# Patient Record
Sex: FEMALE | Race: WHITE | NOT HISPANIC OR LATINO | Employment: FULL TIME | URBAN - NONMETROPOLITAN AREA
[De-identification: names, ages, dates, MRNs, and addresses within clinical notes are randomized per-mention and may not be internally consistent; named-entity substitution may affect disease eponyms.]

---

## 2019-12-11 ENCOUNTER — LAB (OUTPATIENT)
Dept: LAB | Facility: HOSPITAL | Age: 24
End: 2019-12-11

## 2019-12-11 ENCOUNTER — OFFICE VISIT (OUTPATIENT)
Dept: CARDIOLOGY | Facility: CLINIC | Age: 24
End: 2019-12-11

## 2019-12-11 VITALS
HEART RATE: 84 BPM | DIASTOLIC BLOOD PRESSURE: 79 MMHG | HEIGHT: 64 IN | OXYGEN SATURATION: 98 % | SYSTOLIC BLOOD PRESSURE: 124 MMHG | BODY MASS INDEX: 29.02 KG/M2 | WEIGHT: 170 LBS

## 2019-12-11 DIAGNOSIS — R42 DIZZINESS: ICD-10-CM

## 2019-12-11 DIAGNOSIS — R42 DIZZINESS: Primary | ICD-10-CM

## 2019-12-11 DIAGNOSIS — R53.1 WEAKNESS: ICD-10-CM

## 2019-12-11 LAB
ALBUMIN SERPL-MCNC: 4.67 G/DL (ref 3.5–5.2)
ALBUMIN/GLOB SERPL: 1.5 G/DL
ALP SERPL-CCNC: 86 U/L (ref 39–117)
ALT SERPL W P-5'-P-CCNC: 14 U/L (ref 1–33)
ANION GAP SERPL CALCULATED.3IONS-SCNC: 12.9 MMOL/L (ref 5–15)
AST SERPL-CCNC: 20 U/L (ref 1–32)
BASOPHILS # BLD AUTO: 0.03 10*3/MM3 (ref 0–0.2)
BASOPHILS NFR BLD AUTO: 0.4 % (ref 0–1.5)
BILIRUB SERPL-MCNC: 0.3 MG/DL (ref 0.2–1.2)
BUN BLD-MCNC: 7 MG/DL (ref 6–20)
BUN/CREAT SERPL: 9 (ref 7–25)
CALCIUM SPEC-SCNC: 9.4 MG/DL (ref 8.6–10.5)
CHLORIDE SERPL-SCNC: 104 MMOL/L (ref 98–107)
CO2 SERPL-SCNC: 23.1 MMOL/L (ref 22–29)
CREAT BLD-MCNC: 0.78 MG/DL (ref 0.57–1)
DEPRECATED RDW RBC AUTO: 38.5 FL (ref 37–54)
EOSINOPHIL # BLD AUTO: 0.1 10*3/MM3 (ref 0–0.4)
EOSINOPHIL NFR BLD AUTO: 1.3 % (ref 0.3–6.2)
ERYTHROCYTE [DISTWIDTH] IN BLOOD BY AUTOMATED COUNT: 12.2 % (ref 12.3–15.4)
GFR SERPL CREATININE-BSD FRML MDRD: 92 ML/MIN/1.73
GLOBULIN UR ELPH-MCNC: 3.1 GM/DL
GLUCOSE BLD-MCNC: 98 MG/DL (ref 65–99)
HCT VFR BLD AUTO: 40.7 % (ref 34–46.6)
HGB BLD-MCNC: 13.2 G/DL (ref 12–15.9)
IMM GRANULOCYTES # BLD AUTO: 0.02 10*3/MM3 (ref 0–0.05)
IMM GRANULOCYTES NFR BLD AUTO: 0.3 % (ref 0–0.5)
LYMPHOCYTES # BLD AUTO: 2.07 10*3/MM3 (ref 0.7–3.1)
LYMPHOCYTES NFR BLD AUTO: 26.5 % (ref 19.6–45.3)
MCH RBC QN AUTO: 27.9 PG (ref 26.6–33)
MCHC RBC AUTO-ENTMCNC: 32.4 G/DL (ref 31.5–35.7)
MCV RBC AUTO: 86 FL (ref 79–97)
MONOCYTES # BLD AUTO: 0.45 10*3/MM3 (ref 0.1–0.9)
MONOCYTES NFR BLD AUTO: 5.8 % (ref 5–12)
NEUTROPHILS # BLD AUTO: 5.15 10*3/MM3 (ref 1.7–7)
NEUTROPHILS NFR BLD AUTO: 65.7 % (ref 42.7–76)
NRBC BLD AUTO-RTO: 0 /100 WBC (ref 0–0.2)
PLATELET # BLD AUTO: 278 10*3/MM3 (ref 140–450)
PMV BLD AUTO: 10.9 FL (ref 6–12)
POTASSIUM BLD-SCNC: 4.4 MMOL/L (ref 3.5–5.2)
PROT SERPL-MCNC: 7.8 G/DL (ref 6–8.5)
RBC # BLD AUTO: 4.73 10*6/MM3 (ref 3.77–5.28)
SODIUM BLD-SCNC: 140 MMOL/L (ref 136–145)
TSH SERPL DL<=0.05 MIU/L-ACNC: 3.37 UIU/ML (ref 0.27–4.2)
WBC NRBC COR # BLD: 7.82 10*3/MM3 (ref 3.4–10.8)

## 2019-12-11 PROCEDURE — 85025 COMPLETE CBC W/AUTO DIFF WBC: CPT | Performed by: PHYSICIAN ASSISTANT

## 2019-12-11 PROCEDURE — 84443 ASSAY THYROID STIM HORMONE: CPT | Performed by: PHYSICIAN ASSISTANT

## 2019-12-11 PROCEDURE — 36415 COLL VENOUS BLD VENIPUNCTURE: CPT

## 2019-12-11 PROCEDURE — 99202 OFFICE O/P NEW SF 15 MIN: CPT | Performed by: PHYSICIAN ASSISTANT

## 2019-12-11 PROCEDURE — 80053 COMPREHEN METABOLIC PANEL: CPT | Performed by: PHYSICIAN ASSISTANT

## 2019-12-11 PROCEDURE — 93000 ELECTROCARDIOGRAM COMPLETE: CPT | Performed by: PHYSICIAN ASSISTANT

## 2019-12-11 RX ORDER — SERTRALINE HYDROCHLORIDE 25 MG/1
25 TABLET, FILM COATED ORAL DAILY
Qty: 90 TABLET | Refills: 3 | Status: SHIPPED | OUTPATIENT
Start: 2019-12-11 | End: 2020-04-27 | Stop reason: SDUPTHER

## 2019-12-11 NOTE — PATIENT INSTRUCTIONS

## 2019-12-11 NOTE — PROGRESS NOTES
Subjective   Tangela Hyman is a 24 y.o. female     Chief Complaint   Patient presents with   • [facility] Care     patient appears in office today to [facility] cardiac care   • Dizziness       HPI  The patient presents today for initial evaluation.  She wanted to be evaluated today because of dizziness and weakness noted earlier today.  The patient denies cardiovascular history.  She reports that she possibly could have had an episode of hypoglycemia.  She did not eat very well for breakfast this morning.  She is going along unusual routine.  She developed initial and acute onset weakness and dizziness.  She had even presyncope noted.  There is certainly no syncope.  She has no associated chest discomfort.  There was no associated palpitations or dysrhythmic symptoms otherwise.  She feels that a lot of her issues could have been anxiety related.  She typically has a degree of chest tightness and dizziness when very stressed or anxious otherwise.  She also expresses interest in treatment of anxiety.  She has no further complaints otherwise at this time.      Current Outpatient Medications   Medication Sig Dispense Refill   • sertraline (ZOLOFT) 25 MG tablet Take 1 tablet by mouth Daily. 90 tablet 3     No current facility-administered medications for this visit.        Patient has no known allergies.    History reviewed. No pertinent past medical history.    Social History     Socioeconomic History   • Marital status: Single     Spouse name: Not on file   • Number of children: Not on file   • Years of education: Not on file   • Highest education level: Not on file   Tobacco Use   • Smoking status: Never Smoker   • Smokeless tobacco: Never Used   Substance and Sexual Activity   • Alcohol use: Never     Frequency: Never   • Drug use: Never   • Sexual activity: Defer       History reviewed. No pertinent family history.    Review of Systems   Constitutional: Negative.  Negative for fatigue.   HENT: Negative.  Negative for  "congestion, rhinorrhea and sneezing.    Eyes: Negative.  Negative for visual disturbance.   Respiratory: Negative.  Negative for cough, chest tightness, shortness of breath and wheezing.    Cardiovascular: Negative.  Negative for chest pain, palpitations and leg swelling.   Gastrointestinal: Negative.  Negative for abdominal pain and nausea.   Endocrine: Negative.  Negative for cold intolerance and heat intolerance.   Genitourinary: Negative.  Negative for difficulty urinating, frequency and urgency.   Musculoskeletal: Negative.  Negative for arthralgias, back pain, neck pain and neck stiffness.   Skin: Negative.  Negative for rash and wound.   Allergic/Immunologic: Negative.  Negative for environmental allergies and food allergies.   Neurological: Positive for dizziness (dizziness with near syncopal episode) and light-headedness (light headedness with near syncope). Negative for syncope.   Hematological: Negative.  Does not bruise/bleed easily.   Psychiatric/Behavioral: Negative for agitation, confusion and sleep disturbance. The patient is nervous/anxious (easily anxious).        Objective     Vitals:    12/11/19 1541   BP: 124/79   BP Location: Left arm   Patient Position: Sitting   Pulse: 84   SpO2: 98%   Weight: 77.1 kg (170 lb)   Height: 162.6 cm (64\")        /79 (BP Location: Left arm, Patient Position: Sitting)   Pulse 84   Ht 162.6 cm (64\")   Wt 77.1 kg (170 lb)   SpO2 98%   BMI 29.18 kg/m²      Lab Results (most recent)     None          Physical Exam   Constitutional: She is oriented to person, place, and time. She appears well-developed and well-nourished. No distress.   HENT:   Head: Normocephalic and atraumatic.   Eyes: Pupils are equal, round, and reactive to light. Conjunctivae and EOM are normal.   Neck: Normal range of motion. Neck supple. No JVD present. No tracheal deviation present.   Cardiovascular: Normal rate, regular rhythm, normal heart sounds and intact distal pulses. "   Pulmonary/Chest: Effort normal and breath sounds normal.   Abdominal: Soft. Bowel sounds are normal. She exhibits no distension and no mass. There is no tenderness. There is no rebound and no guarding.   Musculoskeletal: Normal range of motion. She exhibits no edema, tenderness or deformity.   Neurological: She is alert and oriented to person, place, and time.   Skin: Skin is warm and dry. No rash noted. No erythema. No pallor.   Psychiatric: She has a normal mood and affect. Her behavior is normal. Judgment and thought content normal.   Nursing note and vitals reviewed.      Procedure     ECG 12 Lead  Date/Time: 12/11/2019 3:43 PM  Performed by: Low Estevez PA  Authorized by: Low Estevez PA   Previous ECG: no previous ECG available  Comments: Sinus rhythm at 82, right axis deviation, minor nonspecific ST and T wave changes, no acute changes noted.                 Assessment/Plan      Diagnosis Plan   1. Dizziness  CBC & Differential    Comprehensive Metabolic Panel    TSH    ECG 12 Lead   2. Weakness       1.  The patient describes dizziness weakness occurring this morning.  This may have been a degree of hypoglycemia.  This also has a component of anxiety however.  I would like to schedule for routine laboratories as above.    2.  Her EKG and exam certainly is benign and suggest no significant cardiac issues or anomalies.  For continued symptoms, I would consider an echo and even potential telemetric monitoring.  For now, the majority of her symptoms appear to be directly related to anxiety however.    3.  In that setting, the patient does request consideration for anxiolytic type medications.  We will start her on very low-dose sertraline at 25 mg 1 p.o. daily.  For any complications with that, she will discontinue that therapy completely.  We will titrate up as needed and as able by clinical course.    4.  We will see her back and review results of the above studies.  As soon as we have the  laboratories available, we will call and recommend her further.  If she has continued symptoms, I would proceed on with testing as above.              Patient's Body mass index is 29.18 kg/m². BMI is above normal parameters. Recommendations include: educational material and referral to primary care.           Electronically signed by:

## 2020-04-27 RX ORDER — SERTRALINE HYDROCHLORIDE 25 MG/1
25 TABLET, FILM COATED ORAL DAILY
Qty: 90 TABLET | Refills: 3 | Status: SHIPPED | OUTPATIENT
Start: 2020-04-27

## 2020-08-05 ENCOUNTER — LAB (OUTPATIENT)
Dept: CARDIOLOGY | Facility: CLINIC | Age: 25
End: 2020-08-05

## 2020-08-05 DIAGNOSIS — R30.9 PAINFUL URINATION: ICD-10-CM

## 2020-08-05 DIAGNOSIS — R30.9 PAINFUL URINATION: Primary | ICD-10-CM

## 2020-08-05 LAB
BILIRUB UR QL STRIP: NEGATIVE
CLARITY UR: CLEAR
COLOR UR: YELLOW
GLUCOSE UR STRIP-MCNC: NEGATIVE MG/DL
HGB UR QL STRIP.AUTO: NEGATIVE
KETONES UR QL STRIP: NEGATIVE
LEUKOCYTE ESTERASE UR QL STRIP.AUTO: NEGATIVE
NITRITE UR QL STRIP: NEGATIVE
PH UR STRIP.AUTO: 7 [PH] (ref 5–8)
PROT UR QL STRIP: NEGATIVE
SP GR UR STRIP: >1.03 (ref 1–1.03)
UROBILINOGEN UR QL STRIP: ABNORMAL

## 2020-08-05 PROCEDURE — 81003 URINALYSIS AUTO W/O SCOPE: CPT | Performed by: NURSE PRACTITIONER

## 2022-02-03 DIAGNOSIS — R50.9 FEVER, UNSPECIFIED FEVER CAUSE: ICD-10-CM

## 2022-02-03 DIAGNOSIS — J02.9 PHARYNGITIS, UNSPECIFIED ETIOLOGY: ICD-10-CM

## 2022-02-03 DIAGNOSIS — M79.10 MYALGIA: Primary | ICD-10-CM

## 2022-02-21 DIAGNOSIS — R39.15 URINARY URGENCY: Primary | ICD-10-CM

## 2023-02-23 ENCOUNTER — LAB (OUTPATIENT)
Dept: CARDIOLOGY | Facility: CLINIC | Age: 28
End: 2023-02-23
Payer: COMMERCIAL

## 2023-02-23 DIAGNOSIS — R30.0 DYSURIA: ICD-10-CM

## 2023-02-23 DIAGNOSIS — R30.0 DYSURIA: Primary | ICD-10-CM

## 2023-02-23 LAB
BACTERIA UR QL AUTO: ABNORMAL /HPF
BILIRUB UR QL STRIP: NEGATIVE
CLARITY UR: CLEAR
COLOR UR: YELLOW
GLUCOSE UR STRIP-MCNC: NEGATIVE MG/DL
HGB UR QL STRIP.AUTO: NEGATIVE
HYALINE CASTS UR QL AUTO: ABNORMAL /LPF
KETONES UR QL STRIP: NEGATIVE
LEUKOCYTE ESTERASE UR QL STRIP.AUTO: NEGATIVE
NITRITE UR QL STRIP: NEGATIVE
PH UR STRIP.AUTO: 6 [PH] (ref 5–8)
PROT UR QL STRIP: NEGATIVE
RBC # UR STRIP: ABNORMAL /HPF
REF LAB TEST METHOD: ABNORMAL
SP GR UR STRIP: 1.02 (ref 1–1.03)
SQUAMOUS #/AREA URNS HPF: ABNORMAL /HPF
UROBILINOGEN UR QL STRIP: NORMAL
WBC # UR STRIP: ABNORMAL /HPF

## 2023-02-23 PROCEDURE — 81001 URINALYSIS AUTO W/SCOPE: CPT | Performed by: PHYSICIAN ASSISTANT

## 2023-10-12 ENCOUNTER — LAB (OUTPATIENT)
Dept: LAB | Facility: HOSPITAL | Age: 28
End: 2023-10-12
Payer: COMMERCIAL

## 2023-10-12 DIAGNOSIS — R30.0 DYSURIA: Primary | ICD-10-CM

## 2023-10-12 DIAGNOSIS — R30.0 DYSURIA: ICD-10-CM

## 2023-10-12 LAB
BILIRUB UR QL STRIP: NEGATIVE
CLARITY UR: CLEAR
COLOR UR: YELLOW
GLUCOSE UR STRIP-MCNC: NEGATIVE MG/DL
HGB UR QL STRIP.AUTO: NEGATIVE
KETONES UR QL STRIP: NEGATIVE
LEUKOCYTE ESTERASE UR QL STRIP.AUTO: NEGATIVE
NITRITE UR QL STRIP: NEGATIVE
PH UR STRIP.AUTO: 5.5 [PH] (ref 5–8)
PROT UR QL STRIP: NEGATIVE
SP GR UR STRIP: 1.01 (ref 1–1.03)
UROBILINOGEN UR QL STRIP: NORMAL

## 2023-10-12 PROCEDURE — 81003 URINALYSIS AUTO W/O SCOPE: CPT | Performed by: PHYSICIAN ASSISTANT

## 2024-02-05 ENCOUNTER — OFFICE VISIT (OUTPATIENT)
Dept: CARDIOLOGY | Facility: CLINIC | Age: 29
End: 2024-02-05
Payer: COMMERCIAL

## 2024-02-05 ENCOUNTER — LAB (OUTPATIENT)
Dept: CARDIOLOGY | Facility: CLINIC | Age: 29
End: 2024-02-05
Payer: COMMERCIAL

## 2024-02-05 VITALS
DIASTOLIC BLOOD PRESSURE: 80 MMHG | HEART RATE: 75 BPM | HEIGHT: 64 IN | BODY MASS INDEX: 34.89 KG/M2 | SYSTOLIC BLOOD PRESSURE: 119 MMHG | WEIGHT: 204.4 LBS | OXYGEN SATURATION: 97 %

## 2024-02-05 DIAGNOSIS — R55 PRE-SYNCOPE: ICD-10-CM

## 2024-02-05 DIAGNOSIS — R00.2 PALPITATIONS: ICD-10-CM

## 2024-02-05 DIAGNOSIS — R06.02 SHORTNESS OF BREATH: ICD-10-CM

## 2024-02-05 DIAGNOSIS — R06.02 SHORTNESS OF BREATH: Primary | ICD-10-CM

## 2024-02-05 LAB
ALBUMIN SERPL-MCNC: 4.6 G/DL (ref 3.5–5.2)
ALBUMIN/GLOB SERPL: 1.4 G/DL
ALP SERPL-CCNC: 91 U/L (ref 39–117)
ALT SERPL W P-5'-P-CCNC: 17 U/L (ref 1–33)
ANION GAP SERPL CALCULATED.3IONS-SCNC: 12.6 MMOL/L (ref 5–15)
AST SERPL-CCNC: 20 U/L (ref 1–32)
BASOPHILS # BLD AUTO: 0.07 10*3/MM3 (ref 0–0.2)
BASOPHILS NFR BLD AUTO: 0.7 % (ref 0–1.5)
BILIRUB SERPL-MCNC: 0.2 MG/DL (ref 0–1.2)
BUN SERPL-MCNC: 10 MG/DL (ref 6–20)
BUN/CREAT SERPL: 11.8 (ref 7–25)
CALCIUM SPEC-SCNC: 9.8 MG/DL (ref 8.6–10.5)
CHLORIDE SERPL-SCNC: 101 MMOL/L (ref 98–107)
CO2 SERPL-SCNC: 22.4 MMOL/L (ref 22–29)
CREAT SERPL-MCNC: 0.85 MG/DL (ref 0.57–1)
DEPRECATED RDW RBC AUTO: 39.1 FL (ref 37–54)
EGFRCR SERPLBLD CKD-EPI 2021: 97 ML/MIN/1.73
EOSINOPHIL # BLD AUTO: 0.35 10*3/MM3 (ref 0–0.4)
EOSINOPHIL NFR BLD AUTO: 3.4 % (ref 0.3–6.2)
ERYTHROCYTE [DISTWIDTH] IN BLOOD BY AUTOMATED COUNT: 12.8 % (ref 12.3–15.4)
GLOBULIN UR ELPH-MCNC: 3.3 GM/DL
GLUCOSE SERPL-MCNC: 120 MG/DL (ref 65–99)
HCT VFR BLD AUTO: 42.5 % (ref 34–46.6)
HGB BLD-MCNC: 14.2 G/DL (ref 12–15.9)
IMM GRANULOCYTES # BLD AUTO: 0.04 10*3/MM3 (ref 0–0.05)
IMM GRANULOCYTES NFR BLD AUTO: 0.4 % (ref 0–0.5)
LYMPHOCYTES # BLD AUTO: 2.6 10*3/MM3 (ref 0.7–3.1)
LYMPHOCYTES NFR BLD AUTO: 25.1 % (ref 19.6–45.3)
MAGNESIUM SERPL-MCNC: 2.1 MG/DL (ref 1.6–2.6)
MCH RBC QN AUTO: 28.2 PG (ref 26.6–33)
MCHC RBC AUTO-ENTMCNC: 33.4 G/DL (ref 31.5–35.7)
MCV RBC AUTO: 84.5 FL (ref 79–97)
MONOCYTES # BLD AUTO: 0.53 10*3/MM3 (ref 0.1–0.9)
MONOCYTES NFR BLD AUTO: 5.1 % (ref 5–12)
NEUTROPHILS NFR BLD AUTO: 6.76 10*3/MM3 (ref 1.7–7)
NEUTROPHILS NFR BLD AUTO: 65.3 % (ref 42.7–76)
NRBC BLD AUTO-RTO: 0 /100 WBC (ref 0–0.2)
PLATELET # BLD AUTO: 342 10*3/MM3 (ref 140–450)
PMV BLD AUTO: 11.2 FL (ref 6–12)
POTASSIUM SERPL-SCNC: 4.1 MMOL/L (ref 3.5–5.2)
PROT SERPL-MCNC: 7.9 G/DL (ref 6–8.5)
RBC # BLD AUTO: 5.03 10*6/MM3 (ref 3.77–5.28)
SODIUM SERPL-SCNC: 136 MMOL/L (ref 136–145)
TSH SERPL DL<=0.05 MIU/L-ACNC: 2.91 UIU/ML (ref 0.27–4.2)
WBC NRBC COR # BLD AUTO: 10.35 10*3/MM3 (ref 3.4–10.8)

## 2024-02-05 PROCEDURE — 93000 ELECTROCARDIOGRAM COMPLETE: CPT | Performed by: PHYSICIAN ASSISTANT

## 2024-02-05 PROCEDURE — 83735 ASSAY OF MAGNESIUM: CPT | Performed by: PHYSICIAN ASSISTANT

## 2024-02-05 PROCEDURE — 99203 OFFICE O/P NEW LOW 30 MIN: CPT | Performed by: PHYSICIAN ASSISTANT

## 2024-02-05 PROCEDURE — 80050 GENERAL HEALTH PANEL: CPT | Performed by: PHYSICIAN ASSISTANT

## 2024-02-05 NOTE — PROGRESS NOTES
Nicky Farias is a 28 y.o. female     Chief Complaint   Patient presents with    Establish Care     Cardiac eval     Dizziness    Palpitations       HPI    The patient presents in the clinic today to reestablish cardiovascular care.  This very pleasant patient has been followed historically through the clinic because of recurrent episodes of dizziness, weakness, and symptoms otherwise.  Her main concern in the past was potential hypoglycemia contributing to symptoms.  She was seen and evaluated accordingly.  She had done well up until recently.  Today, while at work, the patient tells me that she had an episode of presyncope.  She feels that some symptoms may have been related more to stress and anxiety.  Nonetheless, she had an episode of immediate onset of weakness, dizziness, and eventually presyncope.  She had associated tachycardia.  She had nausea and emesis.  There was no loss of bowel or bladder control.  No seizure activity was noted.  She reported no chest pain and has experienced no chest pain as of recent otherwise.  During episode as above, symptoms persisted for approximately 2 to 3 minutes and eventually resolved.  After that time, she had residual nausea and weakness.  She at that time requested cardiac evaluation and presents today in that setting.  The patient reports no significant cardiovascular symptoms over the last several weeks to months.  She has had no further issues otherwise.  Her main issue appears to be mostly with stress and anxiety, which I feel likely is at least some degree contributing to clinical scenario.  She is concerned about symptoms today and would like evaluation at this time.  Of note, EKG today indicates no acute abnormalities.  Orthostatic blood pressure check indicates no significant abnormalities or changes otherwise.    No current outpatient medications on file.     No current facility-administered medications for this visit.  "      Sulfamethoxazole-trimethoprim    Past Medical History:   Diagnosis Date    Anxiety        Social History     Socioeconomic History    Marital status:    Tobacco Use    Smoking status: Never    Smokeless tobacco: Never   Substance and Sexual Activity    Alcohol use: Never    Drug use: Never    Sexual activity: Defer       Family History   Problem Relation Age of Onset    Hypertension Mother        Review of Systems   Constitutional: Negative.  Negative for chills, diaphoresis, fatigue and fever.   HENT: Negative.     Eyes: Negative.  Negative for visual disturbance.   Respiratory: Negative.  Negative for apnea, cough, chest tightness, shortness of breath and wheezing.    Cardiovascular:  Positive for palpitations. Negative for chest pain and leg swelling.   Gastrointestinal: Negative.  Negative for abdominal pain and blood in stool.   Endocrine: Negative.    Genitourinary: Negative.  Negative for hematuria.   Musculoskeletal: Negative.  Negative for arthralgias, back pain, myalgias, neck pain and neck stiffness.   Skin: Negative.  Negative for rash and wound.   Allergic/Immunologic: Negative.  Negative for environmental allergies and food allergies.   Neurological:  Positive for dizziness, syncope (near syncope) and light-headedness. Negative for weakness, numbness and headaches.   Hematological: Negative.  Does not bruise/bleed easily.   Psychiatric/Behavioral: Negative.  Negative for sleep disturbance.        Objective     Vitals:    02/05/24 1612   BP: 119/80   Pulse: 75   SpO2: 97%   Weight: 92.7 kg (204 lb 6.4 oz)   Height: 162.6 cm (64\")        /80   Pulse 75   Ht 162.6 cm (64\")   Wt 92.7 kg (204 lb 6.4 oz)   SpO2 97%   BMI 35.09 kg/m²      Lab Results (most recent)       None            Physical Exam  Vitals and nursing note reviewed.   Constitutional:       General: She is not in acute distress.     Appearance: She is well-developed.   HENT:      Head: Normocephalic and atraumatic. "   Eyes:      Conjunctiva/sclera: Conjunctivae normal.      Pupils: Pupils are equal, round, and reactive to light.   Neck:      Vascular: No JVD.      Trachea: No tracheal deviation.   Cardiovascular:      Rate and Rhythm: Normal rate and regular rhythm.      Heart sounds: Normal heart sounds.   Pulmonary:      Effort: Pulmonary effort is normal.      Breath sounds: Normal breath sounds.   Abdominal:      General: Bowel sounds are normal. There is no distension.      Palpations: Abdomen is soft. There is no mass.      Tenderness: There is no abdominal tenderness. There is no guarding or rebound.   Musculoskeletal:         General: No tenderness or deformity. Normal range of motion.      Cervical back: Normal range of motion and neck supple.   Skin:     General: Skin is warm and dry.      Coloration: Skin is not pale.      Findings: No erythema or rash.   Neurological:      Mental Status: She is alert and oriented to person, place, and time.   Psychiatric:         Behavior: Behavior normal.         Thought Content: Thought content normal.         Judgment: Judgment normal.         Procedure     ECG 12 Lead    Date/Time: 2/5/2024 4:41 PM  Performed by: Low Estevez PA    Authorized by: Low Estevez PA  Comparison: not compared with previous ECG                Assessment & Plan      Diagnosis Plan   1. Shortness of breath  Adult Transthoracic Echo Complete W/ Cont if Necessary Per Protocol    CBC & Differential    Comprehensive Metabolic Panel    Magnesium    TSH    Cardiac Event Monitor      2. Pre-syncope  Adult Transthoracic Echo Complete W/ Cont if Necessary Per Protocol    CBC & Differential    Comprehensive Metabolic Panel    Magnesium    TSH    Cardiac Event Monitor      3. Palpitations  Adult Transthoracic Echo Complete W/ Cont if Necessary Per Protocol    CBC & Differential    Comprehensive Metabolic Panel    Magnesium    TSH    Cardiac Event Monitor        1.  The patient presents for evaluation  because of dizziness, presyncope, and symptoms otherwise as above.  I feel that a lot of the patient's symptoms are likely related to stress/anxiety.  She is very concerned about the potential of hypoglycemia.  Nonetheless, she would like further cardiac evaluation and presents today in that setting.    2.  We will schedule for an event monitor to evaluate for rate or rhythm disturbance issues potentially contributing to symptoms.    3.  I would also schedule for an echo to evaluate LV size and function, valvular morphologies, and cardiac structure otherwise.    4.  We will schedule for routine laboratories, all as outlined above.    5.  If the above is unremarkable, I would not feel further evaluation would be indicated and less symptoms should persist.  We can see the patient back to discuss study results and recommended further.  She will call for any recurrent issues.               Electronically signed by:

## 2024-02-06 RX ORDER — ESCITALOPRAM OXALATE 10 MG/1
10 TABLET ORAL DAILY
Qty: 30 TABLET | Refills: 2 | Status: SHIPPED | OUTPATIENT
Start: 2024-02-06

## 2024-04-02 ENCOUNTER — TELEPHONE (OUTPATIENT)
Dept: CARDIOLOGY | Facility: CLINIC | Age: 29
End: 2024-04-02
Payer: COMMERCIAL

## 2024-04-02 NOTE — TELEPHONE ENCOUNTER
----- Message from Nancy Dowell sent at 4/1/2024  6:21 PM EDT -----    ----- Message -----  From: Stefania Ashby MA  Sent: 4/1/2024   1:33 PM EDT  To: HOLLEY Bynum      ----- Message -----  From: Low Estevez PA  Sent: 4/1/2024   1:21 PM EDT  To: Stefania Ashby MA    Routine follow-up.  ----- Message -----  From: Calixto Hardin MD  Sent: 3/27/2024   6:01 PM EDT  To: BRAYAN Hopper    Cardiac Event Monitor  Order: [id number]  Status: Final result       Visible to patient: Yes (seen)       Dx: Palpitations; Shortness of breath; Pr...    0 Result Notes  Details    Reading Physician Reading Date Result Priority   Calixto Hardin MD  [phone] 3/27/2024 Routine     Result Text  1.  Sinus rhythm is the baseline and predominant rhythm throughout the study.  2.  Maximum heart rate was at 141 bpm, with a minimum heart rate at 51 bpm.  3.  No other arrhythmia, ectopy, or block of significance was detected.  4.  2 manually detected events were noted, correlating to symptoms.  Both appeared during sinus rhythm

## 2024-04-08 RX ORDER — ESCITALOPRAM OXALATE 10 MG/1
10 TABLET ORAL DAILY
Qty: 30 TABLET | Refills: 1 | Status: SHIPPED | OUTPATIENT
Start: 2024-04-08

## 2024-04-17 ENCOUNTER — LAB (OUTPATIENT)
Dept: CARDIOLOGY | Facility: CLINIC | Age: 29
End: 2024-04-17
Payer: COMMERCIAL

## 2024-04-17 ENCOUNTER — OFFICE VISIT (OUTPATIENT)
Dept: CARDIOLOGY | Facility: CLINIC | Age: 29
End: 2024-04-17
Payer: COMMERCIAL

## 2024-04-17 VITALS
SYSTOLIC BLOOD PRESSURE: 106 MMHG | OXYGEN SATURATION: 98 % | WEIGHT: 202.2 LBS | DIASTOLIC BLOOD PRESSURE: 71 MMHG | BODY MASS INDEX: 34.52 KG/M2 | HEIGHT: 64 IN | HEART RATE: 67 BPM

## 2024-04-17 DIAGNOSIS — R30.0 DYSURIA: ICD-10-CM

## 2024-04-17 DIAGNOSIS — R06.02 SHORTNESS OF BREATH: ICD-10-CM

## 2024-04-17 DIAGNOSIS — R00.2 PALPITATIONS: Primary | ICD-10-CM

## 2024-04-17 LAB
BILIRUB UR QL STRIP: NEGATIVE
CLARITY UR: ABNORMAL
COLOR UR: YELLOW
GLUCOSE UR STRIP-MCNC: NEGATIVE MG/DL
HGB UR QL STRIP.AUTO: NEGATIVE
KETONES UR QL STRIP: NEGATIVE
LEUKOCYTE ESTERASE UR QL STRIP.AUTO: NEGATIVE
NITRITE UR QL STRIP: NEGATIVE
PH UR STRIP.AUTO: 8.5 [PH] (ref 5–8)
PROT UR QL STRIP: NEGATIVE
SP GR UR STRIP: 1.02 (ref 1–1.03)
UROBILINOGEN UR QL STRIP: ABNORMAL

## 2024-04-17 PROCEDURE — 81003 URINALYSIS AUTO W/O SCOPE: CPT | Performed by: PHYSICIAN ASSISTANT

## 2024-04-17 PROCEDURE — 99213 OFFICE O/P EST LOW 20 MIN: CPT | Performed by: PHYSICIAN ASSISTANT

## 2024-04-17 RX ORDER — ACYCLOVIR 200 MG/1
200 CAPSULE ORAL 3 TIMES DAILY
Qty: 90 CAPSULE | Refills: 0 | Status: SHIPPED | OUTPATIENT
Start: 2024-04-17

## 2024-04-17 RX ORDER — ESCITALOPRAM OXALATE 20 MG/1
20 TABLET ORAL DAILY
Qty: 30 TABLET | Refills: 2 | Status: SHIPPED | OUTPATIENT
Start: 2024-04-17

## 2024-04-17 RX ORDER — ACYCLOVIR 200 MG/1
400 CAPSULE ORAL
COMMUNITY
End: 2024-04-17 | Stop reason: SDUPTHER

## 2024-04-17 NOTE — PROGRESS NOTES
Problem list     Subjective   Tangela Farias is a 28 y.o. female     Chief Complaint   Patient presents with    Follow-up     Echo results / holter monitor result's        HPI  The patient presents back in the clinic today for evaluation and follow-up.  She was seen at last evaluation because of episodes of palpitations, dizziness, and even presyncope.  She did note some degree of increasing dyspnea as well.  She was scheduled for laboratory evaluation, with those studies being largely benign.  In particular, TSH was within normal limits.  Chemistry profile and hematologic parameters were largely unremarkable for the patient.  Glucose was at 120 but this was a nonfasting specimen.  We did review this in detail with her.  An echo was scheduled, but because of out-of-pocket cost was not performed.  Event monitor indicated sinus rhythm throughout without significant dysrhythmic activity present.  The patient is now doing well.  She feels that a lot of her symptoms directly related to stress and anxiety.  She was started on a degree of anxiolytic therapy, Lexapro 10 mg daily, and feels that this has been beneficial but would like to discuss increasing the dosing because of ongoing anxiety and associated symptoms.  She denies chest pain.  Dyspnea is at baseline.  She has no failure nor dysrhythmic symptoms otherwise.  She has no further complaints.    No current outpatient medications on file prior to visit.     No current facility-administered medications on file prior to visit.       Sertraline and Sulfamethoxazole-trimethoprim    Past Medical History:   Diagnosis Date    Anxiety        Social History     Socioeconomic History    Marital status:    Tobacco Use    Smoking status: Never    Smokeless tobacco: Never   Substance and Sexual Activity    Alcohol use: Never    Drug use: Never    Sexual activity: Defer       Family History   Problem Relation Age of Onset    Hypertension Mother        Review of Systems  "  Constitutional: Negative.  Negative for chills, diaphoresis, fatigue and fever.   HENT: Negative.     Eyes: Negative.  Negative for visual disturbance.   Respiratory: Negative.  Negative for apnea, cough, chest tightness, shortness of breath and wheezing.    Cardiovascular: Negative.  Negative for chest pain, palpitations and leg swelling.   Gastrointestinal: Negative.  Negative for abdominal pain and blood in stool.   Endocrine: Negative.    Genitourinary: Negative.  Negative for hematuria.   Musculoskeletal: Negative.  Negative for arthralgias, back pain, myalgias, neck pain and neck stiffness.   Skin: Negative.  Negative for rash and wound.   Allergic/Immunologic: Negative.  Negative for environmental allergies and food allergies.   Neurological: Negative.  Negative for dizziness, syncope, weakness, light-headedness, numbness and headaches.   Hematological: Negative.  Does not bruise/bleed easily.   Psychiatric/Behavioral: Negative.  Negative for sleep disturbance.        Objective   Vitals:    04/17/24 1304   BP: 106/71   Pulse: 67   SpO2: 98%   Weight: 91.7 kg (202 lb 3.2 oz)   Height: 162.6 cm (64\")      /71   Pulse 67   Ht 162.6 cm (64\")   Wt 91.7 kg (202 lb 3.2 oz)   SpO2 98%   BMI 34.71 kg/m²    Lab Results (most recent)       None          Physical Exam  Vitals and nursing note reviewed.   Constitutional:       General: She is not in acute distress.     Appearance: She is well-developed.   HENT:      Head: Normocephalic and atraumatic.   Eyes:      Conjunctiva/sclera: Conjunctivae normal.      Pupils: Pupils are equal, round, and reactive to light.   Neck:      Vascular: No JVD.      Trachea: No tracheal deviation.   Cardiovascular:      Rate and Rhythm: Normal rate and regular rhythm.      Heart sounds: Normal heart sounds.   Pulmonary:      Effort: Pulmonary effort is normal.      Breath sounds: Normal breath sounds.   Abdominal:      General: Bowel sounds are normal. There is no distension. "      Palpations: Abdomen is soft. There is no mass.      Tenderness: There is no abdominal tenderness. There is no guarding or rebound.   Musculoskeletal:         General: No tenderness or deformity. Normal range of motion.      Cervical back: Normal range of motion and neck supple.   Skin:     General: Skin is warm and dry.      Coloration: Skin is not pale.      Findings: No erythema or rash.   Neurological:      Mental Status: She is alert and oriented to person, place, and time.   Psychiatric:         Behavior: Behavior normal.         Thought Content: Thought content normal.         Judgment: Judgment normal.           Procedure   Procedures       Assessment & Plan      Diagnosis Plan   1. Palpitations        2. Shortness of breath        3. Dysuria  Urinalysis With Culture If Indicated -      The patient presents in the clinic today for follow-up.  She was scheduled for cardiac evaluation because of symptoms of tachycardia, palpitations, and issues otherwise.  She did admit that the likely the majority of symptoms were related to anxiety.  She has been started on therapy directed at the same.  Symptoms have improved.  She would like to consider slightly higher dose.  We will increase Lexapro to 20 mg daily and monitor course.    2.  Echocardiogram was scheduled but was not performed because of out-of-pocket cost.  Event monitor and laboratories were unremarkable as above.  As symptoms have minimized with therapy directed towards anxiety, nothing further unless clinical course change.    3.  She does have a degree of dysuria.  She would like a UA performed.  We will schedule accordingly.    4.  Nothing further from our standpoint we will see her on a yearly evaluation, sooner for recurrent symptoms.               Electronically signed by:

## 2024-06-10 DIAGNOSIS — R30.0 DYSURIA: Primary | ICD-10-CM

## 2024-06-20 RX ORDER — ESCITALOPRAM OXALATE 20 MG/1
20 TABLET ORAL DAILY
Qty: 30 TABLET | Refills: 2 | Status: SHIPPED | OUTPATIENT
Start: 2024-06-20

## 2024-07-16 RX ORDER — ESCITALOPRAM OXALATE 20 MG/1
20 TABLET ORAL DAILY
Qty: 30 TABLET | Refills: 2 | Status: SHIPPED | OUTPATIENT
Start: 2024-07-16

## 2024-12-23 RX ORDER — ESCITALOPRAM OXALATE 20 MG/1
20 TABLET ORAL DAILY
Qty: 90 TABLET | Refills: 3 | Status: SHIPPED | OUTPATIENT
Start: 2024-12-23

## 2025-01-16 DIAGNOSIS — R30.0 DYSURIA: Primary | ICD-10-CM

## 2025-01-20 ENCOUNTER — TELEPHONE (OUTPATIENT)
Dept: CARDIOLOGY | Facility: CLINIC | Age: 30
End: 2025-01-20
Payer: MEDICAID

## 2025-01-20 ENCOUNTER — LAB (OUTPATIENT)
Dept: LAB | Facility: HOSPITAL | Age: 30
End: 2025-01-20
Payer: MEDICAID

## 2025-01-20 DIAGNOSIS — R30.0 DYSURIA: ICD-10-CM

## 2025-01-20 LAB
BACTERIA UR QL AUTO: ABNORMAL /HPF
BILIRUB UR QL STRIP: ABNORMAL
CLARITY UR: ABNORMAL
COLOR UR: ABNORMAL
GLUCOSE UR STRIP-MCNC: NEGATIVE MG/DL
HGB UR QL STRIP.AUTO: NEGATIVE
HYALINE CASTS UR QL AUTO: ABNORMAL /LPF
KETONES UR QL STRIP: ABNORMAL
LEUKOCYTE ESTERASE UR QL STRIP.AUTO: ABNORMAL
NITRITE UR QL STRIP: NEGATIVE
PH UR STRIP.AUTO: 6 [PH] (ref 5–8)
PROT UR QL STRIP: ABNORMAL
RBC # UR STRIP: ABNORMAL /HPF
REF LAB TEST METHOD: ABNORMAL
SP GR UR STRIP: >=1.03 (ref 1–1.03)
SQUAMOUS #/AREA URNS HPF: ABNORMAL /HPF
UROBILINOGEN UR QL STRIP: ABNORMAL
WBC # UR STRIP: ABNORMAL /HPF

## 2025-01-20 PROCEDURE — 81001 URINALYSIS AUTO W/SCOPE: CPT

## 2025-01-20 NOTE — TELEPHONE ENCOUNTER
----- Message from Crystal HOBBS sent at 1/20/2025  1:53 PM EST -----  Pt needs a refill on  escitalopram (LEXAPRO) 20 MG tablet    Rx was submitted to pharmacy on 12/23/24 for 90 day supply with 3 refill's.     Patient notified.

## 2025-01-21 ENCOUNTER — TELEPHONE (OUTPATIENT)
Dept: CARDIOLOGY | Facility: CLINIC | Age: 30
End: 2025-01-21
Payer: MEDICAID

## 2025-01-21 NOTE — TELEPHONE ENCOUNTER
Caller: Tangela Farias    Relationship: Self    Best call back number: [phone]     What is the best time to reach you: ANYTIME    Who are you requesting to speak with (clinical staff, provider,  specific staff member): PROVIDER, CAN LEAVE VM     Do you know the name of the person who called: NA     What was the call regarding: PLEASE READ URINE CULTURE ASAP.     Is it okay if the provider responds through MyChart: NO

## 2025-01-21 NOTE — TELEPHONE ENCOUNTER
Caller: Tangela Farias    Relationship: Self    Best call back number: [phone]    Caller requesting test results: SELF    What test was performed: URANALYSIS     When was the test performed: 1.20.25    Where was the test performed: [facility]    Additional notes: PLEASE CALL PATIENT TO GO OVER RESULTS WHEN AVAILABLE. THANK YOU

## 2025-01-22 ENCOUNTER — TELEPHONE (OUTPATIENT)
Dept: CARDIOLOGY | Facility: CLINIC | Age: 30
End: 2025-01-22
Payer: MEDICAID

## 2025-01-22 RX ORDER — CEPHALEXIN 500 MG/1
500 CAPSULE ORAL 3 TIMES DAILY
Qty: 21 CAPSULE | Refills: 0 | Status: SHIPPED | OUTPATIENT
Start: 2025-01-22

## 2025-01-22 NOTE — TELEPHONE ENCOUNTER
Called patient with result's, patient reports has had flu, her complaint's is that she feel's like she needs to urinate frequently and only urinates a very small amount, I instructed patient to drink plenty of fluids, she reports has been drinking coffee in instructed her that she needs to avoid caffeine and drink plenty of fluids such as water and cranberry juice. Patient verbalizes  understanding and wishes to have antibiotic sent into medicine shop she reports she cannot take bactrim.

## 2025-01-22 NOTE — TELEPHONE ENCOUNTER
----- Message from Low Estevez sent at 1/22/2025 12:03 AM EST -----  Equivocal findings.  If abnormal symptoms persist, I would institute antibiotics empirically.  ----- Message -----  From: Lab, Background User  Sent: 1/20/2025   6:13 PM EST  To: BRAYAN Hopper    Contains abnormal data Urinalysis, Microscopic Only - Urine, Clean Catch  Order: [id number] - Reflex for Order [id number]  Status: Final result       Visible to patient: Yes (seen)       Dx: Dysuria    Specimen Information: Urine, Clean Catch   0 Result Notes       Component  Ref Range & Units 2 d ago 1 yr ago   RBC, UA  None Seen, 0-2 /HPF 0-2 3-5 Abnormal    WBC, UA  None Seen, 0-2 /HPF 0-2 0-2   Bacteria, UA  None Seen /HPF None Seen None Seen   Squamous Epithelial Cells, UA  None Seen, 0-2 /HPF 3-6 Abnormal  0-2   Hyaline Casts, UA  None Seen /LPF None Seen None Seen   Methodology Automated Microscopy Automated Microscopy   [facility]ing Agency  [facility]             Specimen Collected: 01/20/25 13:40 EST Last Resulted: 01/20/25 18:13 EST        Lab Flowsheet        Order Details        View Encounter        Lab and Collection Details        Routing        Result History     View All Conversations on this Encounter     Result Care Coordination      Patient Communication     Released  Seen Back to Top      Contains abnormal data Urinalysis With Culture If Indicated - Urine, Clean Catch  Order: [id number]  Status: Final result       Visible to patient: Yes (seen)       Dx: Dysuria    Specimen Information: Urine, Clean Catch   0 Result Notes          Component  Ref Range & Units 2 d ago  (1/20/25) 9 mo ago  (4/17/24) 1 yr ago  (10/12/23) 1 yr ago  (2/23/23) 4 yr ago  (8/5/20)   Color, UA  Yellow, Straw Dark Yellow Abnormal  Yellow Yellow Yellow Yellow   Appearance, UA  Clear Turbid Abnormal  Turbid Abnormal  Clear Clear Clear   pH, UA  5.0 - 8.0 6.0 8.5 High  5.5 6.0 7.0   Specific Gravity, UA  1.005 - 1.030 >=1.030 1.018 1.013 1.017 >1.030  High    Glucose, UA  Negative Negative Negative Negative Negative Negative   Ketones, UA  Negative Trace Abnormal  Negative Negative Negative Negative   Bilirubin, UA  Negative Small (1+) Abnormal  Negative Negative Negative Negative   Blood, UA  Negative Negative Negative Negative Negative Negative   Protein, UA  Negative Trace Abnormal  Negative Negative Negative Negative   Leuk Esterase, UA  Negative Trace Abnormal  Negative Negative Negative Negative   Nitrite, UA  Negative Negative Negative Negative Negative Negative   Urobilinogen, UA  0.2 - 1.0 E.U./dL 1.0 E.U./dL 0.2 E.U./dL 0.2 E.U./dL 0.2 E.U./dL 1.0 E.U./dL   Resulting Agency  [facility]              [facility]